# Patient Record
Sex: FEMALE | Race: WHITE | Employment: UNEMPLOYED | URBAN - METROPOLITAN AREA
[De-identification: names, ages, dates, MRNs, and addresses within clinical notes are randomized per-mention and may not be internally consistent; named-entity substitution may affect disease eponyms.]

---

## 2018-06-29 ENCOUNTER — HOSPITAL ENCOUNTER (INPATIENT)
Age: 23
LOS: 3 days | Discharge: HOME | DRG: 177 | End: 2018-07-02
Attending: FAMILY MEDICINE | Admitting: FAMILY MEDICINE
Payer: COMMERCIAL

## 2018-06-29 DIAGNOSIS — J90 PLEURAL EFFUSION ON RIGHT: ICD-10-CM

## 2018-06-29 DIAGNOSIS — J86.9 EMPYEMA LUNG (HCC): Primary | ICD-10-CM

## 2018-06-29 LAB
ANION GAP SERPL CALCULATED.3IONS-SCNC: 12 MMOL/L (ref 9–17)
BUN BLDV-MCNC: 10 MG/DL (ref 6–20)
BUN/CREAT BLD: NORMAL (ref 9–20)
CALCIUM SERPL-MCNC: 8.9 MG/DL (ref 8.6–10.4)
CHLORIDE BLD-SCNC: 102 MMOL/L (ref 98–107)
CO2: 24 MMOL/L (ref 20–31)
CREAT SERPL-MCNC: 0.77 MG/DL (ref 0.5–0.9)
GFR AFRICAN AMERICAN: >60 ML/MIN
GFR NON-AFRICAN AMERICAN: >60 ML/MIN
GFR SERPL CREATININE-BSD FRML MDRD: NORMAL ML/MIN/{1.73_M2}
GFR SERPL CREATININE-BSD FRML MDRD: NORMAL ML/MIN/{1.73_M2}
GLUCOSE BLD-MCNC: 81 MG/DL (ref 70–99)
HCT VFR BLD CALC: 36.5 % (ref 36.3–47.1)
HEMOGLOBIN: 11.2 G/DL (ref 11.9–15.1)
MCH RBC QN AUTO: 26 PG (ref 25.2–33.5)
MCHC RBC AUTO-ENTMCNC: 30.7 G/DL (ref 28.4–34.8)
MCV RBC AUTO: 84.9 FL (ref 82.6–102.9)
NRBC AUTOMATED: 0 PER 100 WBC
PDW BLD-RTO: 15.9 % (ref 11.8–14.4)
PLATELET # BLD: 410 K/UL (ref 138–453)
PMV BLD AUTO: 9.4 FL (ref 8.1–13.5)
POTASSIUM SERPL-SCNC: 3.9 MMOL/L (ref 3.7–5.3)
RBC # BLD: 4.3 M/UL (ref 3.95–5.11)
SODIUM BLD-SCNC: 138 MMOL/L (ref 135–144)
WBC # BLD: 9.5 K/UL (ref 4.5–13.5)

## 2018-06-29 PROCEDURE — 2500000003 HC RX 250 WO HCPCS: Performed by: FAMILY MEDICINE

## 2018-06-29 PROCEDURE — 36415 COLL VENOUS BLD VENIPUNCTURE: CPT

## 2018-06-29 PROCEDURE — 6370000000 HC RX 637 (ALT 250 FOR IP): Performed by: NURSE PRACTITIONER

## 2018-06-29 PROCEDURE — 6360000002 HC RX W HCPCS: Performed by: CLINICAL NURSE SPECIALIST

## 2018-06-29 PROCEDURE — 2580000003 HC RX 258: Performed by: CLINICAL NURSE SPECIALIST

## 2018-06-29 PROCEDURE — 80048 BASIC METABOLIC PNL TOTAL CA: CPT

## 2018-06-29 PROCEDURE — 85027 COMPLETE CBC AUTOMATED: CPT

## 2018-06-29 PROCEDURE — 1200000000 HC SEMI PRIVATE

## 2018-06-29 RX ORDER — POTASSIUM CHLORIDE 20 MEQ/1
40 TABLET, EXTENDED RELEASE ORAL PRN
Status: DISCONTINUED | OUTPATIENT
Start: 2018-06-29 | End: 2018-07-02 | Stop reason: HOSPADM

## 2018-06-29 RX ORDER — POTASSIUM CHLORIDE 20MEQ/15ML
40 LIQUID (ML) ORAL PRN
Status: DISCONTINUED | OUTPATIENT
Start: 2018-06-29 | End: 2018-07-02 | Stop reason: HOSPADM

## 2018-06-29 RX ORDER — ONDANSETRON 2 MG/ML
4 INJECTION INTRAMUSCULAR; INTRAVENOUS EVERY 6 HOURS PRN
Status: DISCONTINUED | OUTPATIENT
Start: 2018-06-29 | End: 2018-07-02 | Stop reason: HOSPADM

## 2018-06-29 RX ORDER — NICOTINE 21 MG/24HR
1 PATCH, TRANSDERMAL 24 HOURS TRANSDERMAL DAILY PRN
Status: DISCONTINUED | OUTPATIENT
Start: 2018-06-29 | End: 2018-07-02 | Stop reason: HOSPADM

## 2018-06-29 RX ORDER — BISACODYL 10 MG
10 SUPPOSITORY, RECTAL RECTAL DAILY PRN
Status: DISCONTINUED | OUTPATIENT
Start: 2018-06-29 | End: 2018-07-02 | Stop reason: HOSPADM

## 2018-06-29 RX ORDER — VANCOMYCIN HYDROCHLORIDE 1 G/200ML
1000 INJECTION, SOLUTION INTRAVENOUS EVERY 12 HOURS
Status: DISCONTINUED | OUTPATIENT
Start: 2018-06-29 | End: 2018-06-30

## 2018-06-29 RX ORDER — MAGNESIUM SULFATE 1 G/100ML
1 INJECTION INTRAVENOUS PRN
Status: DISCONTINUED | OUTPATIENT
Start: 2018-06-29 | End: 2018-07-02 | Stop reason: HOSPADM

## 2018-06-29 RX ORDER — ACETAMINOPHEN 500 MG
1000 TABLET ORAL EVERY 6 HOURS PRN
Status: DISCONTINUED | OUTPATIENT
Start: 2018-06-29 | End: 2018-07-02 | Stop reason: HOSPADM

## 2018-06-29 RX ORDER — SODIUM CHLORIDE 0.9 % (FLUSH) 0.9 %
10 SYRINGE (ML) INJECTION PRN
Status: DISCONTINUED | OUTPATIENT
Start: 2018-06-29 | End: 2018-07-02 | Stop reason: HOSPADM

## 2018-06-29 RX ORDER — SODIUM CHLORIDE 0.9 % (FLUSH) 0.9 %
10 SYRINGE (ML) INJECTION EVERY 12 HOURS SCHEDULED
Status: DISCONTINUED | OUTPATIENT
Start: 2018-06-29 | End: 2018-07-02 | Stop reason: HOSPADM

## 2018-06-29 RX ORDER — POTASSIUM CHLORIDE 7.45 MG/ML
10 INJECTION INTRAVENOUS PRN
Status: DISCONTINUED | OUTPATIENT
Start: 2018-06-29 | End: 2018-07-02 | Stop reason: HOSPADM

## 2018-06-29 RX ADMIN — ACETAMINOPHEN 1000 MG: 500 TABLET ORAL at 20:40

## 2018-06-29 RX ADMIN — Medication 10 ML: at 21:00

## 2018-06-29 RX ADMIN — VANCOMYCIN HYDROCHLORIDE 1250 MG: 10 INJECTION, POWDER, LYOPHILIZED, FOR SOLUTION INTRAVENOUS at 21:34

## 2018-06-29 RX ADMIN — TAZOBACTAM SODIUM AND PIPERACILLIN SODIUM 3.38 G: 375; 3 INJECTION, SOLUTION INTRAVENOUS at 21:01

## 2018-06-29 ASSESSMENT — PAIN SCALES - GENERAL
PAINLEVEL_OUTOF10: 10
PAINLEVEL_OUTOF10: 8

## 2018-06-29 NOTE — PROGRESS NOTES
Attempted to gain IV access. 3 failed attempts. Pt has now admitted to IV drug abuse. Pt had to be coaxed into letting RN try to gain access. RN called Nursing supervisor to see if she could gain access; she will see what she can do.

## 2018-06-30 PROBLEM — L01.00 IMPETIGO: Status: ACTIVE | Noted: 2018-06-30

## 2018-06-30 PROBLEM — F19.10 DRUG ABUSE (HCC): Status: ACTIVE | Noted: 2018-06-30

## 2018-06-30 PROBLEM — J90 PLEURAL EFFUSION ON RIGHT: Status: ACTIVE | Noted: 2018-06-30

## 2018-06-30 PROBLEM — B18.2 CHRONIC HEPATITIS C WITHOUT HEPATIC COMA (HCC): Status: ACTIVE | Noted: 2018-06-30

## 2018-06-30 PROCEDURE — 6370000000 HC RX 637 (ALT 250 FOR IP): Performed by: FAMILY MEDICINE

## 2018-06-30 PROCEDURE — 2580000003 HC RX 258: Performed by: CLINICAL NURSE SPECIALIST

## 2018-06-30 PROCEDURE — 99254 IP/OBS CNSLTJ NEW/EST MOD 60: CPT | Performed by: INTERNAL MEDICINE

## 2018-06-30 PROCEDURE — 6360000002 HC RX W HCPCS: Performed by: INTERNAL MEDICINE

## 2018-06-30 PROCEDURE — 2500000003 HC RX 250 WO HCPCS: Performed by: CLINICAL NURSE SPECIALIST

## 2018-06-30 PROCEDURE — 6360000002 HC RX W HCPCS: Performed by: CLINICAL NURSE SPECIALIST

## 2018-06-30 PROCEDURE — 99222 1ST HOSP IP/OBS MODERATE 55: CPT | Performed by: FAMILY MEDICINE

## 2018-06-30 PROCEDURE — 6370000000 HC RX 637 (ALT 250 FOR IP): Performed by: NURSE PRACTITIONER

## 2018-06-30 PROCEDURE — 1200000000 HC SEMI PRIVATE

## 2018-06-30 PROCEDURE — 94762 N-INVAS EAR/PLS OXIMTRY CONT: CPT

## 2018-06-30 RX ORDER — LORAZEPAM 0.5 MG/1
0.5 TABLET ORAL 2 TIMES DAILY PRN
Status: DISCONTINUED | OUTPATIENT
Start: 2018-06-30 | End: 2018-07-02 | Stop reason: HOSPADM

## 2018-06-30 RX ORDER — SERTRALINE HYDROCHLORIDE 25 MG/1
25 TABLET, FILM COATED ORAL DAILY
Status: DISCONTINUED | OUTPATIENT
Start: 2018-06-30 | End: 2018-07-01

## 2018-06-30 RX ORDER — OXYCODONE HYDROCHLORIDE AND ACETAMINOPHEN 5; 325 MG/1; MG/1
1 TABLET ORAL EVERY 4 HOURS PRN
Status: DISCONTINUED | OUTPATIENT
Start: 2018-06-30 | End: 2018-07-02 | Stop reason: HOSPADM

## 2018-06-30 RX ADMIN — OXYCODONE HYDROCHLORIDE AND ACETAMINOPHEN 1 TABLET: 5; 325 TABLET ORAL at 21:22

## 2018-06-30 RX ADMIN — ACETAMINOPHEN 1000 MG: 500 TABLET ORAL at 11:13

## 2018-06-30 RX ADMIN — OXYCODONE HYDROCHLORIDE AND ACETAMINOPHEN 1 TABLET: 5; 325 TABLET ORAL at 12:43

## 2018-06-30 RX ADMIN — OXYCODONE HYDROCHLORIDE AND ACETAMINOPHEN 1 TABLET: 5; 325 TABLET ORAL at 17:19

## 2018-06-30 RX ADMIN — ACETAMINOPHEN 1000 MG: 500 TABLET ORAL at 04:56

## 2018-06-30 RX ADMIN — TAZOBACTAM SODIUM AND PIPERACILLIN SODIUM 3.38 G: 375; 3 INJECTION, SOLUTION INTRAVENOUS at 11:13

## 2018-06-30 RX ADMIN — Medication 10 ML: at 21:13

## 2018-06-30 RX ADMIN — Medication 2 G: at 17:17

## 2018-06-30 RX ADMIN — LORAZEPAM 0.5 MG: 0.5 TABLET ORAL at 12:43

## 2018-06-30 RX ADMIN — VANCOMYCIN HYDROCHLORIDE 1000 MG: 1 INJECTION, SOLUTION INTRAVENOUS at 08:53

## 2018-06-30 RX ADMIN — SERTRALINE 25 MG: 25 TABLET, FILM COATED ORAL at 12:43

## 2018-06-30 RX ADMIN — TAZOBACTAM SODIUM AND PIPERACILLIN SODIUM 3.38 G: 375; 3 INJECTION, SOLUTION INTRAVENOUS at 04:30

## 2018-06-30 ASSESSMENT — PAIN SCALES - GENERAL
PAINLEVEL_OUTOF10: 4
PAINLEVEL_OUTOF10: 4
PAINLEVEL_OUTOF10: 0
PAINLEVEL_OUTOF10: 7
PAINLEVEL_OUTOF10: 7
PAINLEVEL_OUTOF10: 6
PAINLEVEL_OUTOF10: 10
PAINLEVEL_OUTOF10: 6
PAINLEVEL_OUTOF10: 7
PAINLEVEL_OUTOF10: 5

## 2018-06-30 ASSESSMENT — PAIN DESCRIPTION - ORIENTATION
ORIENTATION: RIGHT
ORIENTATION: RIGHT

## 2018-06-30 ASSESSMENT — PAIN DESCRIPTION - LOCATION
LOCATION: CHEST
LOCATION: CHEST

## 2018-06-30 ASSESSMENT — PAIN DESCRIPTION - ONSET
ONSET: ON-GOING
ONSET: ON-GOING

## 2018-06-30 ASSESSMENT — PAIN DESCRIPTION - DESCRIPTORS
DESCRIPTORS: TENDER;SORE
DESCRIPTORS: CONSTANT;SHARP;SORE;TENDER

## 2018-06-30 ASSESSMENT — PAIN DESCRIPTION - PROGRESSION
CLINICAL_PROGRESSION: NOT CHANGED
CLINICAL_PROGRESSION: GRADUALLY IMPROVING

## 2018-06-30 ASSESSMENT — PAIN DESCRIPTION - FREQUENCY
FREQUENCY: CONTINUOUS
FREQUENCY: CONTINUOUS

## 2018-06-30 ASSESSMENT — PAIN DESCRIPTION - PAIN TYPE
TYPE: ACUTE PAIN
TYPE: ACUTE PAIN

## 2018-06-30 NOTE — CONSULTS
[facility] - Initial Consult Note  Today's Date and Time: 6/30/2018, 10:37 AM    Impression :   1. RUL pneumonia  2. Rt side empyema   3. Depression  4. Hx of polysubstance abuse. Has been clean for about 3 months. 5. Hepatitis C    Recommendations:   · Cefazolin 2 gm IV q 8 hr  · Evaluation by Thoracic surgery    Medical Decision Making/Summary/Discussion:   · Young patient with depression and recent problems (6 months) with polysubstance abuse. Currently drug free for about 3 months according to her. · Seen because of chest pain after a fall. · Found to have nodular RUL lesion. (Most likeley pneumonia in retrospect) and small effusion on 6-19-18. · Treated with zosyn and vancomycin with worsening of effusion. (Perhaps fluid retention and Na load from zosyn)  · Underwent needle biopsy of RUL nodular lesion with growth of MSSA on culture on 6-27-18. No associated septicemia. No evidence of multiple septic emboli. · Had attempt at chest tube on 6-28-18 with little fluid recovered. Chest tube out same day. · Referred for thoracic surgery evaluation. Infection Control Recommendations   · Baylis Precautions  · Contact Isolation       Antimicrobial Stewardship Recommendations     · Simplification of therapy  · Targeted therapy    Coordination of Outpatient Care:   · Estimated Length of IV antimicrobials:2-4 weeks  · Patient will need Midline Catheter Insertion: TBD  · Patient will need PICC line Insertion:No  · Patient will need: Home IV , [place],  SNF,  LTAC: TBD  · Patient will need outpatient wound care:No    Chief complaint/reason for consultation:   · Empyema? History of Present Illness:   Cheryle Dean is a 25y.o.-year-old  female who was initially admitted on 6/29/2018. Patient seen at the request of Gerardo Pardo. Patient indicates that she presented to ER at [facility] with chest pain after sustaining a fall.  She was evaluated and released with ibuprofen. Pain continued leading her to return to ER on 6-19-18. A CT was done which revealed a pleuritic nodular lesion and a small effusion. She apparently received antibiotic treatment. (zosyn and vancomycin)    Patient had further progression of the Rt side effusion and on 6-27-18 she underwent a biopsy of the RUL nodular lesion which seemingly produced some bleeding. Culture of the aspirate showed MSSA. Blood cultures showed no growth. Urine grew E coli. ECHO showed no vegetations. Patient had a subsequent chest tube insertion which apparently did not yield much  The chest tube was inserted and removed the same day. (6-28-18 according to patient). CT 6-28-18 shows extensive RLL effusion with few air bubbles. She has been transferred for evaluation by thoracic surgery with concern with MSSA empyema. Patient currently looks non toxic. She is very anxious about what treatment may entail and wishes to expedite treatment because she has two young children to take care of. Past Hx of polysubstance abuse over past 6 months or so because of ending a long term relationship, personal losses. Indicates she smoked marijuana in high school. Got in with the wrong crowd and ended using cocaine and heroin. Has evidence of Hepatitis C exposure. Cultures:  Urine:  ·   Blood:  ·   Sputum :  ·   Wound:  ·     Discussed with patient, RN. I have personally reviewed the past medical history, past surgical history, medications, social history, and family history, and I have updated the database accordingly.   Past Medical History:     Past Medical History:   Diagnosis Date    Drug abuse, cocaine type     Drug abuse, IV        Past Surgical  History:     Past Surgical History:   Procedure Laterality Date    ARM SURGERY Right 03/01/2018    Cellulitus       Medications:      sodium chloride flush  10 mL Intravenous 2 times per day    vancomycin (VANCOCIN) intermittent dosing (placeholder)

## 2018-06-30 NOTE — PLAN OF CARE
Problem: Falls - Risk of:  Goal: Will remain free from falls  Will remain free from falls   Outcome: Met This Shift    Goal: Absence of physical injury  Absence of physical injury   Outcome: Ongoing      Problem: Pain:  Goal: Pain level will decrease  Pain level will decrease   Outcome: Ongoing    Goal: Control of acute pain  Control of acute pain   Outcome: Ongoing    Goal: Control of chronic pain  Control of chronic pain   Outcome: Ongoing

## 2018-06-30 NOTE — PROGRESS NOTES
Dr Lucretia Asher wants writer to recall thoracic surgeon see pt today  Perfect served thoracic surgeon the followingDr Lucretia Asher wants you to see pt told her your partner  would see in am she stated Tell them the patient is sooo anxious and need to see them  today thanks perfect served thoracic surgeon the above  Perfect served thoracic surgeon above, no response back

## 2018-06-30 NOTE — H&P
[facility]    HISTORY AND PHYSICAL EXAMINATION            Date:   6/30/2018  Patient name:  Ric Murdock  Date of admission:  6/29/2018  5:12 PM  MRN:   [id number]  Account:  [de-identified]  YOB: 1995  PCP:    No primary care provider on file. Room:   [address] [place] [address]  Code Status:    Full Code    Chief Complaint:     Chest pain    History Obtained From:     patient,  medical record    History of Present Illness: The patient is a 25 y.o. Non-/non  female who presents with No chief complaint on file. and she is admitted to the hospital for the management of  Empyema. Patient was transferred from outside facility where she stayed there for couple of days where she was diagnosed PNA  ( RUL mass) with R pleural effusion and , she was treated with Vanc and zosyn . CT guided biopsy was done from RUL and R pleural effusion that showed MSSA on  6/27   then  chest tube was placed on 6/28  for empyema but it was clogged shortly with no change in imaging. Decortication ws thought to be an option and patient was transferred for further assessment  She also was diagnosed with impetigo and asymptomatic bacteriuria.     Patient has no known history of IV drug abuse and also was diagnosed with hepatitis C that needed more workup as an outpatient, Per the patient report she did not develop any fever or chills throughout the course of her sickness  She has some chest pain and discomfort on R side, but denies sob, nausea, vomiting, fever or chills    Past Medical History:     Past Medical History:   Diagnosis Date    Drug abuse, cocaine type     Drug abuse, IV         Past Surgical History:     Past Surgical History:   Procedure Laterality Date    ARM SURGERY Right 03/01/2018    Cellulitus        Medications Prior to Admission:     Prior to Admission medications    Not on File        Allergies:     Patient has no known oriented to person, place, and time with normal affect  Head:  normocephalic, atraumatic. Eye: no icterus, redness, pupils equal and reactive, extraocular eye movements intact, conjunctiva clear  Ear: normal external ear, no discharge, hearing intact  Nose:  no drainage noted  Mouth: mucous membranes moist  Neck: supple, no carotid bruits, thyroid not palpable  Lungs: diminished AE along R lung side, chest tube incisions noted  Cardiovascular: normal rate, regular rhythm, no murmur, gallop, rub.   Abdomen: Soft, nontender, nondistended, normal bowel sounds, no hepatomegaly or splenomegaly  Neurologic: There are no new focal motor or sensory deficits, normal muscle tone and bulk, no abnormal sensation, normal speech, cranial nerves II through XII grossly intact  Skin: healing impetigo noticed on face  Extremities:  peripheral pulses palpable, no pedal edema or calf pain with palpation  Psych: normal affect    Investigations:      Laboratory Testing:  Recent Results (from the past 24 hour(s))   Basic Metabolic Panel w/ Reflex to MG    Collection Time: 06/29/18  8:24 PM   Result Value Ref Range    Glucose 81 70 - 99 mg/dL    BUN 10 6 - 20 mg/dL    CREATININE 0.77 0.50 - 0.90 mg/dL    Bun/Cre Ratio NOT REPORTED 9 - 20    Calcium 8.9 8.6 - 10.4 mg/dL    Sodium 138 135 - 144 mmol/L    Potassium 3.9 3.7 - 5.3 mmol/L    Chloride 102 98 - 107 mmol/L    CO2 24 20 - 31 mmol/L    Anion Gap 12 9 - 17 mmol/L    GFR Non-African American >60 >60 mL/min    GFR African American >60 >60 mL/min    GFR Comment          GFR Staging NOT REPORTED    CBC    Collection Time: 06/29/18  8:24 PM   Result Value Ref Range    WBC 9.5 4.5 - 13.5 k/uL    RBC 4.30 3.95 - 5.11 m/uL    Hemoglobin 11.2 (L) 11.9 - 15.1 g/dL    Hematocrit 36.5 36.3 - 47.1 %    MCV 84.9 82.6 - 102.9 fL    MCH 26.0 25.2 - 33.5 pg    MCHC 30.7 28.4 - 34.8 g/dL    RDW 15.9 (H) 11.8 - 14.4 %    Platelets 613 583 - 959 k/uL    MPV 9.4 8.1 - 13.5 fL    NRBC Automated 0.0 0.0 per 100 WBC       Imaging/Diagnostics:    Images is currently being uploaded in our system  Reviewed    Assessment :      Primary Problem  Empyema lung [facility])    Active Hospital Problems    Diagnosis Date Noted    Empyema lung ([facility] [J86.9] 06/29/2018     Priority: High    Pleural effusion on right [J90] 06/30/2018    Impetigo [L01.00] 06/30/2018    Chronic hepatitis C without hepatic coma ([facility] [address] [B18.2] 06/30/2018    Drug abuse [F19.10] 06/30/2018       Plan:     Patient status Admit as inpatient in the  Med/Surge    Continue Vancomycin, would DC Zosyn if OK with ID  Pain management  Anxiety management  CTS conult for possible Decortication   ID consult as patient might need IV Abx for weeks   DVT and GI PPx  Patient has a lot of concerns over her 2 young kids ( 32 years old) and that is why she wants to go home to be available for them, I reassured her that she should make sure her condition is addressed as long as she knows they are in safe hands      Consultations:   IP CONSULT TO INFECTIOUS DISEASES  IP CONSULT TO [facility]     Patient is admitted as inpatient status because of co-morbidities listed above, severity of signs and symptoms as outlined, requirement for current medical therapies and most importantly because of direct risk to patient if care not provided in a hospital setting. Ryan Mishra MD  6/30/2018  3:13 PM    Copy sent to Dr. Tipton primary care provider on file.

## 2018-06-30 NOTE — PROGRESS NOTES
Pharmacy Note  Vancomycin Consult    Armida Ruelas is a 25 y.o. female started on Vancomycin for  ; consult received from Dr. Tom Tarango to manage therapy. Also receiving the following antibiotics: zosyn. Patient Active Problem List   Diagnosis    Empyema lung ([address]       Allergies:  Patient has no known allergies. Temp max: 98.6 F  (37 C)    Recent Labs      06/29/18 2024   BUN  10       Recent Labs      06/29/18 2024   CREATININE  0.77       Recent Labs      06/29/18 2024   WBC  9.5       No intake or output data in the 24 hours ending 06/29/18 2119    Culture Date      Source                       Results       Ht Readings from Last 1 Encounters:   06/29/18 5' 7\" (1.702 m)        Wt Readings from Last 1 Encounters:   06/29/18 135 lb 1.6 oz (61.3 kg)         Body mass index is 21.16 kg/m². Estimated Creatinine Clearance: 112 mL/min (based on SCr of 0.77 mg/dL). Goal Trough Level: 10-15 mcg/mL    Assessment/Plan:  Will initiate vancomycin 1000 mg IV every 12 hours. Timing of trough level will be determined based on culture results, renal function, and clinical response. Thank you for the consult. Will continue to follow.

## 2018-06-30 NOTE — PLAN OF CARE
Problem: Falls - Risk of:  Goal: Will remain free from falls  Will remain free from falls   Outcome: Met This Shift    Goal: Absence of physical injury  Absence of physical injury   Outcome: Met This Shift      Problem: Pain:  Goal: Pain level will decrease  Pain level will decrease   Outcome: Met This Shift    Goal: Control of acute pain  Control of acute pain   Outcome: Met This Shift    Goal: Control of chronic pain  Control of chronic pain   Outcome: Met This Shift

## 2018-07-01 ENCOUNTER — APPOINTMENT (OUTPATIENT)
Dept: CT IMAGING | Age: 23
DRG: 177 | End: 2018-07-01
Attending: FAMILY MEDICINE
Payer: COMMERCIAL

## 2018-07-01 LAB
ANION GAP SERPL CALCULATED.3IONS-SCNC: 13 MMOL/L (ref 9–17)
BUN BLDV-MCNC: 12 MG/DL (ref 6–20)
BUN/CREAT BLD: ABNORMAL (ref 9–20)
CALCIUM SERPL-MCNC: 8.7 MG/DL (ref 8.6–10.4)
CHLORIDE BLD-SCNC: 103 MMOL/L (ref 98–107)
CO2: 22 MMOL/L (ref 20–31)
CREAT SERPL-MCNC: 0.78 MG/DL (ref 0.5–0.9)
GFR AFRICAN AMERICAN: >60 ML/MIN
GFR NON-AFRICAN AMERICAN: >60 ML/MIN
GFR SERPL CREATININE-BSD FRML MDRD: ABNORMAL ML/MIN/{1.73_M2}
GFR SERPL CREATININE-BSD FRML MDRD: ABNORMAL ML/MIN/{1.73_M2}
GLUCOSE BLD-MCNC: 113 MG/DL (ref 70–99)
HCT VFR BLD CALC: 38.1 % (ref 36.3–47.1)
HEMOGLOBIN: 11.8 G/DL (ref 11.9–15.1)
MCH RBC QN AUTO: 26.1 PG (ref 25.2–33.5)
MCHC RBC AUTO-ENTMCNC: 31 G/DL (ref 28.4–34.8)
MCV RBC AUTO: 84.3 FL (ref 82.6–102.9)
NRBC AUTOMATED: 0 PER 100 WBC
PDW BLD-RTO: 15.9 % (ref 11.8–14.4)
PLATELET # BLD: 524 K/UL (ref 138–453)
PMV BLD AUTO: 9.6 FL (ref 8.1–13.5)
POTASSIUM SERPL-SCNC: 4.1 MMOL/L (ref 3.7–5.3)
RBC # BLD: 4.52 M/UL (ref 3.95–5.11)
SODIUM BLD-SCNC: 138 MMOL/L (ref 135–144)
WBC # BLD: 13.4 K/UL (ref 4.5–13.5)

## 2018-07-01 PROCEDURE — 80048 BASIC METABOLIC PNL TOTAL CA: CPT

## 2018-07-01 PROCEDURE — 1200000000 HC SEMI PRIVATE

## 2018-07-01 PROCEDURE — 6370000000 HC RX 637 (ALT 250 FOR IP): Performed by: FAMILY MEDICINE

## 2018-07-01 PROCEDURE — 71250 CT THORAX DX C-: CPT

## 2018-07-01 PROCEDURE — 2580000003 HC RX 258: Performed by: CLINICAL NURSE SPECIALIST

## 2018-07-01 PROCEDURE — 85027 COMPLETE CBC AUTOMATED: CPT

## 2018-07-01 PROCEDURE — 6370000000 HC RX 637 (ALT 250 FOR IP): Performed by: NURSE PRACTITIONER

## 2018-07-01 PROCEDURE — 99233 SBSQ HOSP IP/OBS HIGH 50: CPT | Performed by: INTERNAL MEDICINE

## 2018-07-01 PROCEDURE — 99232 SBSQ HOSP IP/OBS MODERATE 35: CPT | Performed by: FAMILY MEDICINE

## 2018-07-01 PROCEDURE — 36415 COLL VENOUS BLD VENIPUNCTURE: CPT

## 2018-07-01 PROCEDURE — 6360000002 HC RX W HCPCS: Performed by: INTERNAL MEDICINE

## 2018-07-01 RX ADMIN — OXYCODONE HYDROCHLORIDE AND ACETAMINOPHEN 1 TABLET: 5; 325 TABLET ORAL at 22:19

## 2018-07-01 RX ADMIN — Medication 2 G: at 00:50

## 2018-07-01 RX ADMIN — OXYCODONE HYDROCHLORIDE AND ACETAMINOPHEN 1 TABLET: 5; 325 TABLET ORAL at 14:31

## 2018-07-01 RX ADMIN — LORAZEPAM 0.5 MG: 0.5 TABLET ORAL at 01:56

## 2018-07-01 RX ADMIN — OXYCODONE HYDROCHLORIDE AND ACETAMINOPHEN 1 TABLET: 5; 325 TABLET ORAL at 06:01

## 2018-07-01 RX ADMIN — Medication 2 G: at 08:33

## 2018-07-01 RX ADMIN — OXYCODONE HYDROCHLORIDE AND ACETAMINOPHEN 1 TABLET: 5; 325 TABLET ORAL at 01:56

## 2018-07-01 RX ADMIN — SERTRALINE 25 MG: 25 TABLET, FILM COATED ORAL at 08:33

## 2018-07-01 RX ADMIN — OXYCODONE HYDROCHLORIDE AND ACETAMINOPHEN 1 TABLET: 5; 325 TABLET ORAL at 10:02

## 2018-07-01 RX ADMIN — ACETAMINOPHEN 500 MG: 500 TABLET ORAL at 21:36

## 2018-07-01 RX ADMIN — Medication 10 ML: at 21:07

## 2018-07-01 RX ADMIN — OXYCODONE HYDROCHLORIDE AND ACETAMINOPHEN 1 TABLET: 5; 325 TABLET ORAL at 18:35

## 2018-07-01 RX ADMIN — Medication 2 G: at 16:32

## 2018-07-01 RX ADMIN — LORAZEPAM 0.5 MG: 0.5 TABLET ORAL at 14:33

## 2018-07-01 RX ADMIN — Medication 10 ML: at 08:33

## 2018-07-01 ASSESSMENT — PAIN DESCRIPTION - ORIENTATION: ORIENTATION: RIGHT

## 2018-07-01 ASSESSMENT — PAIN SCALES - GENERAL
PAINLEVEL_OUTOF10: 4
PAINLEVEL_OUTOF10: 4
PAINLEVEL_OUTOF10: 5
PAINLEVEL_OUTOF10: 7
PAINLEVEL_OUTOF10: 5
PAINLEVEL_OUTOF10: 7
PAINLEVEL_OUTOF10: 9
PAINLEVEL_OUTOF10: 7
PAINLEVEL_OUTOF10: 4
PAINLEVEL_OUTOF10: 3
PAINLEVEL_OUTOF10: 6

## 2018-07-01 ASSESSMENT — PAIN DESCRIPTION - DESCRIPTORS: DESCRIPTORS: TENDER;SORE

## 2018-07-01 ASSESSMENT — PAIN DESCRIPTION - PROGRESSION: CLINICAL_PROGRESSION: GRADUALLY IMPROVING

## 2018-07-01 ASSESSMENT — PAIN DESCRIPTION - FREQUENCY: FREQUENCY: CONTINUOUS

## 2018-07-01 ASSESSMENT — PAIN DESCRIPTION - ONSET: ONSET: ON-GOING

## 2018-07-01 ASSESSMENT — PAIN DESCRIPTION - LOCATION: LOCATION: CHEST

## 2018-07-01 ASSESSMENT — PAIN DESCRIPTION - PAIN TYPE: TYPE: ACUTE PAIN

## 2018-07-01 NOTE — PROGRESS NOTES
Consulted for empyema. Attempted to see during morning rounds. Patient Afebrile VSS, /94, WBC 9.5. No CXR, or CT available. Unable to give recommendations until imaging available. Will repeat CT scan here.     Carol Goodman

## 2018-07-01 NOTE — PROGRESS NOTES
[facility]    Progress Note    7/1/2018    4:42 PM    Name:   Alec Shay  MRN:     [id number]     [place]:      [de-identified]   Room:   [address] [place] [address] Day:  2  Admit Date:  6/29/2018  5:12 PM    PCP:   No primary care provider on file. Code Status:  Full Code    Subjective:     C/C: Empyem    Interval History Status: improved. Patient seen and examined at bedside, no acute events overnight. Seen by CTS and ID  Re imaging ordered this am by CTS, thought yesterday that outside facility imaging was being downloaded to our system  Continue to breath fine   Positive urine screen at outside facility  Patient denies any chest pain, shortness of breath, chills, fevers, nausea or vomiting. Patient vitals, labs and all providers notes were reviewed,from overnight shift and morning updates were noted and discussed with the nurse    Brief History:     The patient is a 25 y.o. Non-/non  female who presents with No chief complaint on file. and she is admitted to the hospital for the management of  Empyema. Patient was transferred from outside facility where she stayed there for couple of days where she was diagnosed PNA  ( RUL mass) with R pleural effusion and , she was treated with Vanc and zosyn . CT guided biopsy was done from RUL and R pleural effusion that showed MSSA on  6/27   then  chest tube was placed on 6/28  for empyema but it was clogged shortly with no change in imaging.  Decortication ws thought to be an option and patient was transferred for further assessment  She also was diagnosed with impetigo and asymptomatic bacteriuria.     Patient has no known history of IV drug abuse and also was diagnosed with hepatitis C that needed more workup as an outpatient, Per the patient report she did not develop any fever or chills throughout the course of her sickness  She has some chest pain and discomfort on R side, but denies sob, nausea, vomiting, fever or chills  On admission patient was evaluated by infectious disease consult to thoracic surgery, antibiotic was switched to cefazolin and CTS requested repeat imaging. : CT chest without contrast pending    Review of Systems:     Constitutional:  negative for chills, fevers, sweats  Respiratory:  negative for cough, dyspnea on exertion, hemoptysis, shortness of breath, wheezing  Cardiovascular:  negative for chest pain, chest pressure/discomfort, lower extremity edema, palpitations  Gastrointestinal:  negative for abdominal pain, constipation, diarrhea, nausea, vomiting  Neurological:  negative for dizziness, headache    Medications: Allergies:  No Known Allergies    Current Meds:   Scheduled Meds:    sertraline  25 mg Oral Daily    ceFAZolin  2 g Intravenous Q8H    sodium chloride flush  10 mL Intravenous 2 times per day     Continuous Infusions:   PRN Meds: oxyCODONE-acetaminophen, LORazepam, sodium chloride flush, potassium chloride **OR** potassium chloride **OR** potassium chloride, magnesium sulfate, magnesium hydroxide, bisacodyl, ondansetron, nicotine, acetaminophen    Data:     Past Medical History:   has a past medical history of Drug abuse, cocaine type and Drug abuse, IV. Social History:   reports that she quit smoking about 1 weeks ago. Her smoking use included Cigarettes. She started smoking about 4 years ago. She has a 4.00 pack-year smoking history. She has never used smokeless tobacco. She reports that she uses drugs, including IV, Cocaine, Opiates , and Marijuana, about 3 times per week. She reports that she does not drink alcohol. Family History: No family history on file.     Vitals:  /63   Pulse 79   Temp 99.5 °F (37.5 °C) (Oral)   Resp 18   Ht 5' 7\" (1.702 m)   Wt 135 lb 1.6 oz (61.3 kg)   SpO2 97%   BMI 21.16 kg/m²   Temp (24hrs), Av °F (37.2 °C), Min:98.3 °F (36.8 °C), Max:99.5 °F (37.5 °C)    No results for input(s): POCGLU in the last 72 hours. I/O (24Hr): No intake or output data in the 24 hours ending 07/01/18 1642    Labs:    Hematology:  Recent Labs      06/29/18 2024 07/01/18   1531   WBC  9.5  13.4   RBC  4.30  4.52   HGB  11.2*  11.8*   HCT  36.5  38.1   MCV  84.9  84.3   MCH  26.0  26.1   MCHC  30.7  31.0   RDW  15.9*  15.9*   PLT  410  524*   MPV  9.4  9.6     Chemistry:  Recent Labs      06/29/18 2024   NA  138   K  3.9   CL  102   CO2  24   GLUCOSE  81   BUN  10   CREATININE  0.77   ANIONGAP  12   LABGLOM  >60   GFRAA  >60   CALCIUM  8.9     No results for input(s): PROT, LABALBU, LABA1C, A9AXBBC, N0VYWME, FT4, TSH, AST, ALT, LDH, GGT, ALKPHOS, LABGGT, BILITOT, BILIDIR, AMMONIA, AMYLASE, LIPASE, LACTATE, CHOL, HDL, LDLCHOLESTEROL, CHOLHDLRATIO, TRIG, VLDL, TOK98EQ, PHENYTOIN, PHENYF, URICACID, POCGLU in the last 72 hours.       No results found for: SPECIAL  No results found for: CULTURE    No results found for: POCPH, PHART, PH, POCPCO2, YWE9HKY, PCO2, POCPO2, PO2ART, PO2, POCHCO3, NFX6YXT, HCO3, NBEA, PBEA, BEART, BE, THGBART, THB, HIS7DCJ, LZYB9FNU, N1TWVATH, O2SAT, FIO2    Radiology:         Physical Examination:        General appearance:  alert, cooperative and no distress  Mental Status:  oriented to person, place and time and normal affect  Lungs:  clear to auscultation bilaterally, normal effort  Heart:  regular rate and rhythm, no murmur  Abdomen:  soft, nontender, nondistended, normal bowel sounds, no masses, hepatomegaly, splenomegaly  Extremities:  no edema, redness, tenderness in the calves  Skin:  no gross lesions, rashes, induration    Assessment:        Primary Problem  Empyema lung ([facility] [address]    Active Hospital Problems    Diagnosis Date Noted    [facility] [J86.9] 06/29/2018     Priority: High    Pleural effusion on right [J90] 06/30/2018    Impetigo [L01.00] 06/30/2018    Chronic [facility] without hepatic coma [facility] [B18.2] 06/30/2018    Drug abuse [F19.10] 06/30/2018    MSSA (methicillin susceptible Staphylococcus aureus) infection [A49.01]        Plan:            Switched to cefazolin for ID  Pain management  Anxiety management  CTS conult on bord, final decision is after repeat CT chest review, likely no intervention   ID consult  patient will need IV Abx for weeks   DVT and GI PPx  Patient has a lot of concerns over her 2 young kids ( 32 years old) and that is why she wants to go home to be available for them, I reassured her that she should make sure her condition is addressed as long as she knows they are in safe hands.   Need for IV Abx and her abuse history will make placement necessary    Monica Reinoso MD  7/1/2018  4:42 PM

## 2018-07-01 NOTE — PROGRESS NOTES
y.o.-year-old  female who was initially admitted on 6/29/2018. Patient seen at the request of Bea Marie. Patient indicates that she presented to ER at [facility] with chest pain after sustaining a fall. She was evaluated and released with ibuprofen. Pain continued leading her to return to ER on 6-19-18. A CT was done which revealed a pleuritic nodular lesion and a small effusion. She apparently received antibiotic treatment. (zosyn and vancomycin)    Patient had further progression of the Rt side effusion and on 6-27-18 she underwent a biopsy of the RUL nodular lesion which seemingly produced some bleeding. Culture of the aspirate showed MSSA. Blood cultures showed no growth. Urine grew E coli. ECHO showed no vegetations. Patient had a subsequent chest tube insertion which apparently did not yield much  The chest tube was inserted and removed the same day. (6-28-18 according to patient). CT 6-28-18 shows extensive RLL effusion with few air bubbles. She has been transferred for evaluation by thoracic surgery with concern with MSSA empyema. Patient currently looks non toxic. She is very anxious about what treatment may entail and wishes to expedite treatment because she has two young children to take care of. Past Hx of polysubstance abuse over past 6 months or so because of ending a long term relationship, personal losses. Indicates she smoked marijuana in high school. Got in with the wrong crowd and ended using cocaine and heroin. Has evidence of Hepatitis C exposure. Cultures:  Sputum :  MSSA   Urine Ecoli       Discussed with patient, RN. I have personally reviewed the past medical history, past surgical history, medications, social history, and family history, and I have updated the database accordingly.   Past Medical History:     Past Medical History:   Diagnosis Date    Drug abuse, cocaine type     Drug abuse, IV        Past Surgical  History:     Past Surgical History:   Procedure Laterality Date    ARM SURGERY Right 03/01/2018    Cellulitus       Medications:      sertraline  25 mg Oral Daily    ceFAZolin  2 g Intravenous Q8H    sodium chloride flush  10 mL Intravenous 2 times per day       Social History:     Social History     Social History    Marital status: Single     Spouse name: N/A    Number of children: N/A    Years of education: N/A     Occupational History    Not on file. Social History Main Topics    Smoking status: Former Smoker     Packs/day: 1.00     Years: 4.00     Types: Cigarettes     Start date: 6/1/2014     Quit date: 6/18/2018    Smokeless tobacco: Never Used    Alcohol use No    Drug use: Yes     Frequency: 3.0 times per week     Types: IV, Cocaine, Opiates , Marijuana    Sexual activity: Not on file     Other Topics Concern    Not on file     Social History Narrative    No narrative on file       Family History:   No family history on file. Allergies:   Patient has no known allergies. Review of Systems:   Constitutional: No fevers or chills. No systemic complaints  Head: No headaches  Eyes: No double vision or blurry vision. No conjunctival inflammation. ENT: No sore throat or runny nose. . No hearing loss, tinnitus or vertigo. Cardiovascular: No chest pain or palpitations. No shortness of breath. No CAZARES  Lung: No shortness of breath or cough. No sputum production. Chest wall pain  Abdomen: No nausea, vomiting, diarrhea, or abdominal pain. Sherlean Narrow No cramps. Genitourinary: No increased urinary frequency, or dysuria. No hematuria. No suprapubic or CVA pain  Musculoskeletal: No muscle aches or pains. No joint effusions, swelling or deformities  Hematologic: No bleeding or bruising. Neurologic: No headache, weakness, numbness, or tingling. Integument: No rash, no ulcers. Facial and arm lesions. Psychiatric: No depression. Endocrine: No polyuria, no polydipsia, no polyphagia.     Physical Examination :     Patient Vitals for and CXR reviewed; RUL pneumonia (small area)  RT pleural effusion with compressive atelectasis. Medical Decision Making-Other:   ECHO at [facility]: No vegetations. Thank you for allowing us to participate in the care of this patient. Please call with questions. Tosha Bell MD  PGY-1, Internal Medicine resident  [facility]. ATTESTATION:    I have discussed the case, including pertinent history and exam findings with the residents. I have seen and examined the patient and the key elements of the encounter have been performed by me. I have reviewed the laboratory data, other diagnostic studies and discussed them with the residents. I have updated the medical record where necessary. I agree with the assessment, plan and orders as documented by the resident.     Elisa Mendoza MD.

## 2018-07-01 NOTE — CONSULTS
ondansetron (ZOFRAN) injection 4 mg, 4 mg, Intravenous, Q6H PRN, Skip Zuleyka, APRN - CNS    nicotine (NICODERM CQ) 21 MG/24HR 1 patch, 1 patch, Transdermal, Daily PRN, Skip Zuleyka, APRN - CNS    acetaminophen (TYLENOL) tablet 1,000 mg, 1,000 mg, Oral, Q6H PRN, [place] Collins, APRN - CNP, 1,000 mg at 06/30/18 1113    Social Hx:  She  reports that she quit smoking about 1 weeks ago. Her smoking use included Cigarettes. She started smoking about 4 years ago. She has a 4.00 pack-year smoking history. She has never used smokeless tobacco.  Family Hx:  family history is not on file. ROS:    Review of Systems    Physical Examination    Vitals:  Vitals:    06/30/18 2115   BP: (!) 137/98   Pulse: 63   Resp: 18   Temp: 98.3 °F (36.8 °C)   SpO2: 99%     Physical Exam    Testing:  Cardiac cath: none    Echo: none    CXR:/CT scan: Neither a CXR or CT has been obtained since this patients transfer. There is a CT loaded in PACS from 6/28 from an outside hospital.      Assessment:  In summary, Ms. Shelia Wallace is a 25y.o. year-old female with a cloudy thoracic intervention history as above at an outside hospital.  Ironically, the patient was transferred here for thoracic management yet no chest Xray was ordered and the previous chest CT was not in the system yesterday morning when we rounded. Please obtain a new PA and Lateral chest film and new non-contrast chest CT to assess for interval fluid accumulation/interval pleural thickening/changes as well as to establish current baseline for future follow up. I would tend to agree with ID that as good as she looks it is unlikely that she will require intervention but the decision will rest on updated imaging.     Problem List:   Patient Active Problem List   Diagnosis    Empyema lung (HCC)    Pleural effusion on right    Impetigo    Chronic hepatitis C without hepatic coma (HCC)    Drug abuse    MSSA (methicillin susceptible Staphylococcus aureus) infection

## 2018-07-01 NOTE — CARE COORDINATION
Transition planning  Met with pt to discuss needs post hospital - possible need for long-term IV antibiotics. Pt has hx of IV drug use - discussed difficulties with home infusion and long term IV access, pt verbalizes understanding. Discussed SNF placement if able to locate accepting SNF - pt refuses this option as she has 2 small children to care for. [facility]. Currently on IV antibiotic three times daily - unsure if this schedule can be accommodated in an outpatient setting and will need to be followed up during business hours. Shawna phone # [phone], attempted to call no answer. She will be staying with her father after the hospital and states that she will have transportation to get to and from outpatient infusion, if needed. .    Pt states frustration and concern over being away from her children and anxious to discharge so she can be with them.  Support given, SW referral offered to ensure that she has acces to community resources to provide support - declined as she has seen and received information from the  at the previous hospital.

## 2018-07-02 VITALS
RESPIRATION RATE: 17 BRPM | DIASTOLIC BLOOD PRESSURE: 108 MMHG | HEART RATE: 96 BPM | OXYGEN SATURATION: 94 % | SYSTOLIC BLOOD PRESSURE: 152 MMHG | TEMPERATURE: 98.5 F | HEIGHT: 67 IN | WEIGHT: 135.1 LBS | BODY MASS INDEX: 21.2 KG/M2

## 2018-07-02 LAB
ABO/RH: NORMAL
ANTIBODY SCREEN: NEGATIVE
ARM BAND NUMBER: NORMAL
EXPIRATION DATE: NORMAL

## 2018-07-02 PROCEDURE — 99255 IP/OBS CONSLTJ NEW/EST HI 80: CPT | Performed by: THORACIC SURGERY (CARDIOTHORACIC VASCULAR SURGERY)

## 2018-07-02 PROCEDURE — 6360000002 HC RX W HCPCS: Performed by: STUDENT IN AN ORGANIZED HEALTH CARE EDUCATION/TRAINING PROGRAM

## 2018-07-02 PROCEDURE — 6360000002 HC RX W HCPCS: Performed by: INTERNAL MEDICINE

## 2018-07-02 PROCEDURE — 86900 BLOOD TYPING SEROLOGIC ABO: CPT

## 2018-07-02 PROCEDURE — 2580000003 HC RX 258: Performed by: CLINICAL NURSE SPECIALIST

## 2018-07-02 PROCEDURE — 99239 HOSP IP/OBS DSCHRG MGMT >30: CPT | Performed by: INTERNAL MEDICINE

## 2018-07-02 PROCEDURE — 2500000003 HC RX 250 WO HCPCS: Performed by: STUDENT IN AN ORGANIZED HEALTH CARE EDUCATION/TRAINING PROGRAM

## 2018-07-02 PROCEDURE — 99233 SBSQ HOSP IP/OBS HIGH 50: CPT | Performed by: INTERNAL MEDICINE

## 2018-07-02 PROCEDURE — 36415 COLL VENOUS BLD VENIPUNCTURE: CPT

## 2018-07-02 PROCEDURE — 86901 BLOOD TYPING SEROLOGIC RH(D): CPT

## 2018-07-02 PROCEDURE — 6370000000 HC RX 637 (ALT 250 FOR IP): Performed by: FAMILY MEDICINE

## 2018-07-02 PROCEDURE — 86850 RBC ANTIBODY SCREEN: CPT

## 2018-07-02 RX ADMIN — LORAZEPAM 0.5 MG: 0.5 TABLET ORAL at 06:28

## 2018-07-02 RX ADMIN — CEFTRIAXONE SODIUM 1 G: 2 INJECTION, POWDER, FOR SOLUTION INTRAMUSCULAR; INTRAVENOUS at 16:35

## 2018-07-02 RX ADMIN — OXYCODONE HYDROCHLORIDE AND ACETAMINOPHEN 1 TABLET: 5; 325 TABLET ORAL at 11:40

## 2018-07-02 RX ADMIN — OXYCODONE HYDROCHLORIDE AND ACETAMINOPHEN 1 TABLET: 5; 325 TABLET ORAL at 06:28

## 2018-07-02 RX ADMIN — OXYCODONE HYDROCHLORIDE AND ACETAMINOPHEN 1 TABLET: 5; 325 TABLET ORAL at 16:35

## 2018-07-02 RX ADMIN — Medication 10 ML: at 09:12

## 2018-07-02 RX ADMIN — Medication 2 G: at 09:11

## 2018-07-02 RX ADMIN — Medication 2 G: at 00:37

## 2018-07-02 ASSESSMENT — PAIN SCALES - GENERAL
PAINLEVEL_OUTOF10: 7
PAINLEVEL_OUTOF10: 2
PAINLEVEL_OUTOF10: 8
PAINLEVEL_OUTOF10: 3
PAINLEVEL_OUTOF10: 7
PAINLEVEL_OUTOF10: 2
PAINLEVEL_OUTOF10: 3

## 2018-07-02 NOTE — DISCHARGE SUMMARY
[facility] 19    Discharge Summary     Patient ID: Radha Mendieta  :  1995   MRN: [id number]     ACCOUNT:  [de-identified]   Patient's PCP: No primary care provider on file. Admit Date: 2018   Discharge Date: 2018     Length of Stay: 3  Code Status:  Full Code  Admitting Physician: Burton Momin MD  Discharge Physician: Troy Hair MD     Active Discharge Diagnoses:     Hospital Problem Lists:  Principal Problem:    Empyema lung ([address]  Active Problems:    Pleural effusion on right    Impetigo    Chronic hepatitis C without hepatic coma ([address]    Drug abuse    MSSA (methicillin susceptible Staphylococcus aureus) infection  Resolved Problems:    * No resolved hospital problems. *      Admission Condition:  poor     Discharged Condition: fair    Hospital Stay:     Hospital Course:  Radha Mendieta is a 25 y.o. female who was admitted for the management of   Empyema lung ([address] , presented to ER with chest pain. The patient is a 25 y.o.  Non-/non  female who presents with Empyema.   and she is admitted to the hospital for the management of  Empyema. Patient was transferred from outside facility where she stayed there for couple of days where she was diagnosed PNA  ( RUL mass) with R pleural effusion and , she was treated with Vanc and zosyn .  CT guided biopsy was done from RUL and R pleural effusion that showed MSSA on     then  chest tube was placed on   for empyema but it was clogged shortly with no change in imaging.  Decortication ws thought to be an option and patient was transferred for further assessment  She also was diagnosed with impetigo and asymptomatic bacteriuria.     Patient has no known history of IV drug abuse and also was diagnosed with hepatitis C that needed more workup as an outpatient, Per the patient report she did not develop any fever or chills throughout the course of her sickness  She has some chest pain and discomfort on R side, but denies sob, nausea, vomiting, fever or chills  On admission patient was evaluated by infectious disease consult to thoracic surgery, antibiotic was switched to cefazolin and CTS requested repeat imaging. 7/1: CT chest without contrast confirmed loculated effusion. 7/2: CTS and ID suggested surgery to patient but patient adamantly refused. Significant therapeutic interventions:     Principal Problem: Empyema lung ([address] CT Chest without contrast showed persistent right sided loculated pleural fluid, with right basilar consolidation consistent with focal pneumonia. CTS and ID on board. Biopsy with culture grew MSSA. Patient insisting on discharge despite repeated efforts to  her, assuage her fears and misgivings regarding surgery. Plan now for discharge with 3 weeks IV ceftriaxone daily at infusion center. F/U With ID in 3 weeks. CTS info provided in case of worsening of symptoms.      Pain Mx     Anxiety Mx. Cont prozac. PRN Ativan.      H/O IVDU. No line placement. Daily IV antibiotics.      Chronic hepatitis C without hepatic coma (HCC)     Tobacco Abuse Disorder. Cont PRN Nicotine patches.      Check Electrolytes and Electrolytes replacement as needed      DVT prophylaxis: Encourage ambulation. SCDs.      PT, OT as needed.       Significant Diagnostic Studies:   Labs / Micro:  CBC:   Lab Results   Component Value Date    WBC 13.4 07/01/2018    RBC 4.52 07/01/2018    HGB 11.8 07/01/2018    HCT 38.1 07/01/2018    MCV 84.3 07/01/2018    MCH 26.1 07/01/2018    MCHC 31.0 07/01/2018    RDW 15.9 07/01/2018     07/01/2018     BMP:    Lab Results   Component Value Date    GLUCOSE 113 07/01/2018     07/01/2018    K 4.1 07/01/2018     07/01/2018    CO2 22 07/01/2018    ANIONGAP 13 07/01/2018    BUN 12 07/01/2018    CREATININE 0.78 07/01/2018    BUNCRER NOT REPORTED 07/01/2018    CALCIUM 8.7 07/01/2018    LABGLOM >60 07/01/2018    GFRAA >60 07/01/2018    GFR      07/01/2018    GFR NOT REPORTED 07/01/2018     HFP:  No results found for: ALB, PROT  CMP:    Lab Results   Component Value Date    GLUCOSE 113 07/01/2018     07/01/2018    K 4.1 07/01/2018     07/01/2018    CO2 22 07/01/2018    BUN 12 07/01/2018    CREATININE 0.78 07/01/2018    ANIONGAP 13 07/01/2018    LABGLOM >60 07/01/2018    GFRAA >60 07/01/2018    GFR      07/01/2018    GFR NOT REPORTED 07/01/2018    CALCIUM 8.7 07/01/2018     Radiology:    Ct Chest Wo Contrast   Result Date: 7/1/2018  1. Mildly loculated pleural fluid and gas collection at the right lung base, measuring 10 x 4.5 x 8.7 cm, compatible with provided clinical history of empyema. A pigtail pleural catheter previously seen in this location has been removed. 2.  Focal airspace consolidation in the posterior aspect of the right upper lobe, not significantly changed from the previous outside study dated 6/28/2018. This was previously biopsied on 6/27/2018 at [facility].  The appearance is most suggestive of a focal pneumonia, but correlation with pathology results is recommended. Consultations:    Consults:     Final Specialist Recommendations/Findings:   IP CONSULT TO INFECTIOUS DISEASES  IP CONSULT TO CARDIOTHORACIC SURGERY      The patient was seen and examined on day of discharge and this discharge summary is in conjunction with any daily progress note from day of discharge.     Discharge plan:     Disposition: Home    Physician Follow Up:     [address]      appt time 2 pm (enter through front door of hospital go to welcome desk then go to 2nd floor)      Ivana Hinojosa MD  [address] [facility],[address] [facility]    In 3 weeks      MD [address]  [address] [place] [phone]    Call  If symptoms worsen       Requiring Further

## 2018-07-02 NOTE — PROGRESS NOTES
and asymptomatic bacteriuria.     Patient has no known history of IV drug abuse and also was diagnosed with hepatitis C that needed more workup as an outpatient, Per the patient report she did not develop any fever or chills throughout the course of her sickness  She has some chest pain and discomfort on R side, but denies sob, nausea, vomiting, fever or chills  On admission patient was evaluated by infectious disease consult to thoracic surgery, antibiotic was switched to cefazolin and CTS requested repeat imaging. 7/1: CT chest without contrast pending    Review of Systems:     Negative except for those highlighted:    CONSTITUTIONAL:  fevers, chills, sweats, fatigue, weight loss, generalized weakness  HEENT:  Vision changes, hearing changes, runny nose, throat pain  RESPIRATORY:  shortness of breath, cough, congestion, wheezing. CARDIOVASCULAR:  chest pain, palpitations  GASTROINTESTINAL:  nausea, vomiting, diarrhea, constipation, change in bowel habits, abdominal pain   GENITOURINARY:  difficulty of urination, burning with urination, frequency   INTEGUMENT:  rash, skin lesions, easy bruising   HEMATOLOGIC/LYMPHATIC:  swelling/edema   ALLERGIC/IMMUNOLOGIC:  urticaria , itching  ENDOCRINE:  increase in drinking, increase in urination, hot or cold intolerance  MUSCULOSKELETAL:  Right chest/flank pain  NEUROLOGICAL:  headaches, dizziness, lightheadedness, numbness, pain, tingling extremities  BEHAVIOR/PSYCH:  depression, anxiety    Medications:      Allergies:  No Known Allergies    Current Meds:   Scheduled Meds:    sertraline  50 mg Oral Daily    ceFAZolin  2 g Intravenous Q8H    sodium chloride flush  10 mL Intravenous 2 times per day     Continuous Infusions:   PRN Meds: oxyCODONE-acetaminophen, LORazepam, sodium chloride flush, potassium chloride **OR** potassium chloride **OR** potassium chloride, magnesium sulfate, magnesium hydroxide, bisacodyl, ondansetron, nicotine, acetaminophen    Data:     Past Medical History:   has a past medical history of Drug abuse, cocaine type and Drug abuse, IV. Social History:   reports that she quit smoking about 2 weeks ago. Her smoking use included Cigarettes. She started smoking about 4 years ago. She has a 4.00 pack-year smoking history. She has never used smokeless tobacco. She reports that she uses drugs, including IV, Cocaine, Opiates , and Marijuana, about 3 times per week. She reports that she does not drink alcohol. Family History: No family history on file. Vitals:  BP (!) 138/94   Pulse 76   Temp 98.4 °F (36.9 °C) (Oral)   Resp 18   Ht 5' 7\" (1.702 m)   Wt 135 lb 1.6 oz (61.3 kg)   SpO2 97%   BMI 21.16 kg/m²   Temp (24hrs), Av.1 °F (37.3 °C), Min:98.4 °F (36.9 °C), Max:99.5 °F (37.5 °C)    No results for input(s): POCGLU in the last 72 hours. I/O (24Hr): No intake or output data in the 24 hours ending 18 0737    Labs:    Hematology:  Recent Labs      18   1531   WBC  9.5  13.4   RBC  4.30  4.52   HGB  11.2*  11.8*   HCT  36.5  38.1   MCV  84.9  84.3   MCH  26.0  26.1   MCHC  30.7  31.0   RDW  15.9*  15.9*   PLT  410  524*   MPV  9.4  9.6     Chemistry:  Recent Labs      18   1531   NA  138  138   K  3.9  4.1   CL  102  103   CO2  24  22   GLUCOSE  81  113*   BUN  10  12   CREATININE  0.77  0.78   ANIONGAP  12  13   LABGLOM  >60  >60   GFRAA  >60  >60   CALCIUM  8.9  8.7     No results for input(s): PROT, LABALBU, LABA1C, O0UATVC, J5YNLHM, FT4, TSH, AST, ALT, LDH, GGT, ALKPHOS, LABGGT, BILITOT, BILIDIR, AMMONIA, AMYLASE, LIPASE, LACTATE, CHOL, HDL, LDLCHOLESTEROL, CHOLHDLRATIO, TRIG, VLDL, LRR40RP, PHENYTOIN, PHENYF, URICACID, POCGLU in the last 72 hours.       No results found for: SPECIAL  No results found for: CULTURE    No results found for: POCPH, PHART, PH, POCPCO2, BFZ0KTP, PCO2, POCPO2, PO2ART, PO2, POCHCO3, XZQ5VLI, HCO3, NBEA, PBEA, BEART, BE, THGBART, THB, TBI7JPB, DACL2URP, Z5GUOIPM, O2SAT, FIO2    Radiology:    Ct Chest Wo Contrast  Result Date: 2018  EXAMINATION: CT OF THE CHEST WITHOUT CONTRAST 2018 9:53 am    1. Mildly loculated pleural fluid and gas collection at the right lung base, measuring 10 x 4.5 x 8.7 cm, compatible with provided clinical history of empyema. A pigtail pleural catheter previously seen in this location has been removed. 2.  Focal airspace consolidation in the posterior aspect of the right upper lobe, not significantly changed from the previous outside study dated 2018. This was previously biopsied on 2018 at [facility].  The appearance is most suggestive of a focal pneumonia, but correlation with pathology results is recommended. Physical Examination:        BP (!) 138/94   Pulse 76   Temp 98.4 °F (36.9 °C) (Oral)   Resp 18   Ht 5' 7\" (1.702 m)   Wt 135 lb 1.6 oz (61.3 kg)   SpO2 97%   BMI 21.16 kg/m²   Temp (24hrs), Av.1 °F (37.3 °C), Min:98.4 °F (36.9 °C), Max:99.5 °F (37.5 °C)    No results for input(s): POCGLU in the last 72 hours. No intake or output data in the 24 hours ending 18 0737    General Appearance:  alert, well appearing, and in no acute distress  Mental status: oriented to person, place, and time with normal affect  Head:  normocephalic, atraumatic. Eye: no icterus, redness, pupils equal and reactive, extraocular eye movements intact, conjunctiva clear  Ear: normal external ear, no discharge, hearing intact  Nose:  no drainage noted  Mouth: mucous membranes moist  Neck: supple, no carotid bruits, thyroid not palpable  Lungs: Decrease breath sounds Right middle lung downwards. Cardiovascular: normal rate, regular rhythm, no murmur, gallop, rub.   Abdomen: Soft, nontender, nondistended, normal bowel sounds, no hepatomegaly or splenomegaly  Neurologic: There are no new focal motor or sensory deficits, normal muscle tone and bulk, no abnormal sensation, normal speech, cranial nerves II through XII grossly intact  Skin: No gross lesions, rashes, bruising or bleeding on exposed skin area  Extremities:  peripheral pulses palpable, no pedal edema or calf pain with palpation  Psych: anxious, irritable, requesting discharge      Assessment:        Principal Problem:    Empyema lung ([address]  Active Problems:    Pleural effusion on right    Impetigo    Chronic hepatitis C without hepatic coma (HCC)    Drug abuse    MSSA (methicillin susceptible Staphylococcus aureus) infection  Resolved Problems:    * No resolved hospital problems. *      Plan:        Principal Problem: Empyema lung ([address] CT Chest without contrast showed persistent right sided loculated pleural fluid, with right basilar consolidation consistent with focal pneumonia. CTS and ID on board. Biopsy with culture grew MSSA. Patient insisting on discharge despite repeated efforts to  her, assuage her fears and misgivings regarding surgery. Plan now for discharge with 3 weeks IV ceftriaxone daily at infusion center. F/U With ID in 3 weeks. CTS info provided in case of worsening of symptoms. Pain Mx    Anxiety Mx. Cont prozac. PRN Ativan. H/O IVDU. No line placement. Daily IV antibiotics. Chronic hepatitis C without hepatic coma (HCC)    Tobacco Abuse Disorder. Cont PRN Nicotine patches. Check Electrolytes and Electrolytes replacement as needed     DVT prophylaxis: Encourage ambulation. SCDs. PT, OT as needed.       IV Fluids: ,    Nutrition:  DIET GENERAL;      Consultations:   IP CONSULT TO INFECTIOUS DISEASES  IP CONSULT TO CARDIOTHORACIC SURGERY  IP CONSULT TO IV TEAM      Elana Hart MD  7/2/2018  7:37 AM

## 2018-07-02 NOTE — CARE COORDINATION
Plan home with op infusion, called [facility] [phone] spoke to [address] can accept dr landon's order. Cannot accommodate tid infusion hours are 10-7 or 7-5   15:25 Dr Carlos Alberto Dubois rounding plan rocephin 2 gm x 21 days called marianne cunningham with [facility] infusion [facility] to discuss referral faxed requested documents appt till 2pm daily. Ps dr Wiley Hightower  16;30 attempted to set up pcp called two office's require patient to fill out paperwork prior for review before accepting.  Gave patients address will mail information to patient will also give patient list to schedule follow up

## 2018-07-02 NOTE — PROGRESS NOTES
advice offered by several physicians. Infection Control Recommendations   · [place] Precautions  Antimicrobial Stewardship Recommendations     · Simplification of therapy  · Targeted therapy    Coordination of Outpatient Care:   · Estimated Length of IV antimicrobials:2-4 weeks  · Patient will need Midline Catheter Insertion: TBD  · Patient will need PICC line Insertion:No  · Patient will need: Home IV , [place],  SNF,  LTAC: TBD  · Patient will need outpatient wound care:No    Chief complaint/reason for consultation:   · Empyema? History of Present Illness:   Elmira Robbins is a 25y.o.-year-old  female who was initially admitted on 6/29/2018. Patient seen at the request of Ni Hunter. Patient indicates that she presented to ER at [facility] with chest pain after sustaining a fall. She was evaluated and released with ibuprofen. Pain continued leading her to return to ER on 6-19-18. A CT was done which revealed a pleuritic nodular lesion and a small effusion. She apparently received antibiotic treatment. (zosyn and vancomycin)    Patient had further progression of the Rt side effusion and on 6-27-18 she underwent a biopsy of the RUL nodular lesion which seemingly produced some bleeding. Culture of the aspirate showed MSSA. Blood cultures showed no growth. Urine grew E coli. ECHO showed no vegetations. Patient had a subsequent chest tube insertion which apparently did not yield much  The chest tube was inserted and removed the same day. (6-28-18 according to patient). CT 6-28-18 shows extensive RLL effusion with few air bubbles. She has been transferred for evaluation by thoracic surgery with concern with MSSA empyema. Patient currently looks non toxic. She is very anxious about what treatment may entail and wishes to expedite treatment because she has two young children to take care of.     Past Hx of polysubstance abuse over past 6 months or so because of ending a long

## 2018-07-03 ENCOUNTER — TELEPHONE (OUTPATIENT)
Dept: INFECTIOUS DISEASES | Age: 23
End: 2018-07-03

## 2018-07-03 NOTE — LETTER
[facility] [address] [place] [address].  [address] [facility],5[address] [address]  Phone: [phone]  Fax: [phone]    Frantz Ramirez MD        July 9, 2018    [place] [id number] [facility] [address] [facility]      Dear Shawna Guevara: We have tried to reach you by phone with no success. Please contact our office at [phone] to schedule a follow up appointment.       Sincerely,    Frantz Ramirez MD

## 2018-07-03 NOTE — TELEPHONE ENCOUNTER
I called patient and voicemail message asks me to enter a mailbox number. Unable to leave a message.

## 2019-02-27 PROCEDURE — 99284 EMERGENCY DEPT VISIT MOD MDM: CPT

## 2019-02-28 ENCOUNTER — HOSPITAL ENCOUNTER (EMERGENCY)
Facility: HOSPITAL | Age: 24
Discharge: HOME | End: 2019-02-28
Attending: EMERGENCY MEDICINE | Admitting: EMERGENCY MEDICINE

## 2019-02-28 ENCOUNTER — APPOINTMENT (OUTPATIENT)
Dept: ULTRASOUND IMAGING | Facility: HOSPITAL | Age: 24
End: 2019-02-28

## 2019-02-28 VITALS
HEART RATE: 90 BPM | DIASTOLIC BLOOD PRESSURE: 88 MMHG | BODY MASS INDEX: 22.5 KG/M2 | WEIGHT: 140 LBS | SYSTOLIC BLOOD PRESSURE: 142 MMHG | HEIGHT: 66 IN | TEMPERATURE: 98 F | RESPIRATION RATE: 16 BRPM | OXYGEN SATURATION: 99 %

## 2019-02-28 DIAGNOSIS — N39.0 ACUTE UTI: Primary | ICD-10-CM

## 2019-02-28 LAB
ALBUMIN SERPL-MCNC: 4.5 G/DL (ref 3.5–5)
ALBUMIN/GLOB SERPL: 1.4 G/DL (ref 1.5–2.5)
ALP SERPL-CCNC: 102 U/L (ref 35–104)
ALT SERPL W P-5'-P-CCNC: 297 U/L (ref 10–36)
ANION GAP SERPL CALCULATED.3IONS-SCNC: 3.4 MMOL/L (ref 3.6–11.2)
AST SERPL-CCNC: 74 U/L (ref 10–30)
B-HCG UR QL: NEGATIVE
BACTERIA UR QL AUTO: ABNORMAL /HPF
BASOPHILS # BLD AUTO: 0.05 10*3/MM3 (ref 0–0.3)
BASOPHILS NFR BLD AUTO: 0.9 % (ref 0–2)
BILIRUB SERPL-MCNC: 0.6 MG/DL (ref 0.2–1.8)
BILIRUB UR QL STRIP: NEGATIVE
BUN BLD-MCNC: 16 MG/DL (ref 7–21)
BUN/CREAT SERPL: 16.5 (ref 7–25)
CALCIUM SPEC-SCNC: 9.7 MG/DL (ref 7.7–10)
CHLORIDE SERPL-SCNC: 106 MMOL/L (ref 99–112)
CLARITY UR: ABNORMAL
CO2 SERPL-SCNC: 27.6 MMOL/L (ref 24.3–31.9)
COLOR UR: ABNORMAL
CREAT BLD-MCNC: 0.97 MG/DL (ref 0.43–1.29)
DEPRECATED RDW RBC AUTO: 48.7 FL (ref 37–54)
EOSINOPHIL # BLD AUTO: 0.2 10*3/MM3 (ref 0–0.7)
EOSINOPHIL NFR BLD AUTO: 3.7 % (ref 0–5)
ERYTHROCYTE [DISTWIDTH] IN BLOOD BY AUTOMATED COUNT: 15.3 % (ref 11.5–14.5)
GFR SERPL CREATININE-BSD FRML MDRD: 72 ML/MIN/1.73
GLOBULIN UR ELPH-MCNC: 3.3 GM/DL
GLUCOSE BLD-MCNC: 70 MG/DL (ref 70–110)
GLUCOSE UR STRIP-MCNC: NEGATIVE MG/DL
HCT VFR BLD AUTO: 43.6 % (ref 37–47)
HGB BLD-MCNC: 14.5 G/DL (ref 12–16)
HGB UR QL STRIP.AUTO: NEGATIVE
HYALINE CASTS UR QL AUTO: ABNORMAL /LPF
IMM GRANULOCYTES # BLD AUTO: 0 10*3/MM3 (ref 0–0.03)
IMM GRANULOCYTES NFR BLD AUTO: 0 % (ref 0–0.5)
KETONES UR QL STRIP: ABNORMAL
LEUKOCYTE ESTERASE UR QL STRIP.AUTO: ABNORMAL
LYMPHOCYTES # BLD AUTO: 1.98 10*3/MM3 (ref 1–3)
LYMPHOCYTES NFR BLD AUTO: 36.4 % (ref 21–51)
MCH RBC QN AUTO: 29.4 PG (ref 27–33)
MCHC RBC AUTO-ENTMCNC: 33.3 G/DL (ref 33–37)
MCV RBC AUTO: 88.3 FL (ref 80–94)
MONOCYTES # BLD AUTO: 0.69 10*3/MM3 (ref 0.1–0.9)
MONOCYTES NFR BLD AUTO: 12.7 % (ref 0–10)
MUCOUS THREADS URNS QL MICRO: ABNORMAL /HPF
NEUTROPHILS # BLD AUTO: 2.52 10*3/MM3 (ref 1.4–6.5)
NEUTROPHILS NFR BLD AUTO: 46.3 % (ref 30–70)
NITRITE UR QL STRIP: NEGATIVE
OSMOLALITY SERPL CALC.SUM OF ELEC: 273.4 MOSM/KG (ref 273–305)
PH UR STRIP.AUTO: 5.5 [PH] (ref 5–8)
PLATELET # BLD AUTO: 319 10*3/MM3 (ref 130–400)
PMV BLD AUTO: 9.6 FL (ref 6–10)
POTASSIUM BLD-SCNC: 3.6 MMOL/L (ref 3.5–5.3)
PROT SERPL-MCNC: 7.8 G/DL (ref 6–8)
PROT UR QL STRIP: ABNORMAL
RBC # BLD AUTO: 4.94 10*6/MM3 (ref 4.2–5.4)
RBC # UR: ABNORMAL /HPF
REF LAB TEST METHOD: ABNORMAL
SODIUM BLD-SCNC: 137 MMOL/L (ref 135–153)
SP GR UR STRIP: 1.02 (ref 1–1.03)
SQUAMOUS #/AREA URNS HPF: ABNORMAL /HPF
UROBILINOGEN UR QL STRIP: ABNORMAL
WBC NRBC COR # BLD: 5.44 10*3/MM3 (ref 4.5–12.5)
WBC UR QL AUTO: ABNORMAL /HPF

## 2019-02-28 PROCEDURE — 36415 COLL VENOUS BLD VENIPUNCTURE: CPT

## 2019-02-28 PROCEDURE — 81001 URINALYSIS AUTO W/SCOPE: CPT | Performed by: PHYSICIAN ASSISTANT

## 2019-02-28 PROCEDURE — 76830 TRANSVAGINAL US NON-OB: CPT

## 2019-02-28 PROCEDURE — 80053 COMPREHEN METABOLIC PANEL: CPT | Performed by: PHYSICIAN ASSISTANT

## 2019-02-28 PROCEDURE — 85025 COMPLETE CBC W/AUTO DIFF WBC: CPT | Performed by: PHYSICIAN ASSISTANT

## 2019-02-28 PROCEDURE — 76830 TRANSVAGINAL US NON-OB: CPT | Performed by: RADIOLOGY

## 2019-02-28 PROCEDURE — 81025 URINE PREGNANCY TEST: CPT | Performed by: PHYSICIAN ASSISTANT

## 2019-02-28 RX ORDER — QUETIAPINE FUMARATE 50 MG/1
50 TABLET, FILM COATED ORAL NIGHTLY
COMMUNITY

## 2019-02-28 RX ORDER — NITROFURANTOIN 25; 75 MG/1; MG/1
100 CAPSULE ORAL EVERY 12 HOURS SCHEDULED
Qty: 14 CAPSULE | Refills: 0 | Status: SHIPPED | OUTPATIENT
Start: 2019-02-28 | End: 2019-03-07

## 2019-02-28 RX ORDER — CLONIDINE HYDROCHLORIDE 0.1 MG/1
0.1 TABLET ORAL 2 TIMES DAILY
COMMUNITY

## 2019-07-30 ENCOUNTER — HOSPITAL ENCOUNTER (INPATIENT)
Age: 24
LOS: 2 days | Discharge: HOME | DRG: 885 | End: 2019-08-01
Attending: PSYCHIATRY & NEUROLOGY | Admitting: PSYCHIATRY & NEUROLOGY
Payer: COMMERCIAL

## 2019-07-30 PROBLEM — F32.2 SEVERE MAJOR DEPRESSION (HCC): Status: ACTIVE | Noted: 2019-07-30

## 2019-07-30 PROCEDURE — 6370000000 HC RX 637 (ALT 250 FOR IP): Performed by: NURSE PRACTITIONER

## 2019-07-30 PROCEDURE — 6370000000 HC RX 637 (ALT 250 FOR IP): Performed by: PSYCHIATRY & NEUROLOGY

## 2019-07-30 PROCEDURE — 1240000000 HC EMOTIONAL WELLNESS R&B

## 2019-07-30 PROCEDURE — 90792 PSYCH DIAG EVAL W/MED SRVCS: CPT | Performed by: NURSE PRACTITIONER

## 2019-07-30 RX ORDER — QUETIAPINE FUMARATE 200 MG/1
200 TABLET, FILM COATED ORAL NIGHTLY
Status: ON HOLD | COMMUNITY
End: 2019-07-31 | Stop reason: HOSPADM

## 2019-07-30 RX ORDER — TRAZODONE HYDROCHLORIDE 50 MG/1
50 TABLET ORAL NIGHTLY PRN
Status: DISCONTINUED | OUTPATIENT
Start: 2019-07-30 | End: 2019-08-01 | Stop reason: HOSPADM

## 2019-07-30 RX ORDER — CLONIDINE HYDROCHLORIDE 0.1 MG/1
0.2 TABLET ORAL 2 TIMES DAILY
Status: DISCONTINUED | OUTPATIENT
Start: 2019-07-30 | End: 2019-08-01 | Stop reason: HOSPADM

## 2019-07-30 RX ORDER — HYDROXYZINE 50 MG/1
50 TABLET, FILM COATED ORAL 3 TIMES DAILY PRN
Status: DISCONTINUED | OUTPATIENT
Start: 2019-07-30 | End: 2019-08-01 | Stop reason: HOSPADM

## 2019-07-30 RX ORDER — NICOTINE 21 MG/24HR
1 PATCH, TRANSDERMAL 24 HOURS TRANSDERMAL DAILY
Status: DISCONTINUED | OUTPATIENT
Start: 2019-07-30 | End: 2019-07-30

## 2019-07-30 RX ORDER — ESCITALOPRAM OXALATE 10 MG/1
10 TABLET ORAL DAILY
Status: DISCONTINUED | OUTPATIENT
Start: 2019-07-30 | End: 2019-08-01 | Stop reason: HOSPADM

## 2019-07-30 RX ORDER — NICOTINE 21 MG/24HR
1 PATCH, TRANSDERMAL 24 HOURS TRANSDERMAL DAILY
Status: DISCONTINUED | OUTPATIENT
Start: 2019-07-30 | End: 2019-08-01 | Stop reason: HOSPADM

## 2019-07-30 RX ORDER — HYDROXYZINE PAMOATE 50 MG/1
50 CAPSULE ORAL 3 TIMES DAILY PRN
Status: ON HOLD | COMMUNITY
End: 2019-07-31 | Stop reason: SDUPTHER

## 2019-07-30 RX ORDER — CLONIDINE HYDROCHLORIDE 0.2 MG/1
0.2 TABLET ORAL 2 TIMES DAILY
COMMUNITY

## 2019-07-30 RX ORDER — DIVALPROEX SODIUM 500 MG/1
500 TABLET, EXTENDED RELEASE ORAL NIGHTLY
Status: DISCONTINUED | OUTPATIENT
Start: 2019-07-30 | End: 2019-08-01 | Stop reason: HOSPADM

## 2019-07-30 RX ADMIN — NICOTINE POLACRILEX 2 MG: 2 GUM, CHEWING BUCCAL at 09:39

## 2019-07-30 RX ADMIN — ESCITALOPRAM OXALATE 10 MG: 10 TABLET ORAL at 11:48

## 2019-07-30 RX ADMIN — TRAZODONE HYDROCHLORIDE 50 MG: 50 TABLET ORAL at 21:17

## 2019-07-30 RX ADMIN — NICOTINE POLACRILEX 2 MG: 2 GUM, CHEWING BUCCAL at 21:24

## 2019-07-30 RX ADMIN — CLONIDINE HYDROCHLORIDE 0.2 MG: 0.1 TABLET ORAL at 21:17

## 2019-07-30 RX ADMIN — DIVALPROEX SODIUM 500 MG: 500 TABLET, FILM COATED, EXTENDED RELEASE ORAL at 21:17

## 2019-07-30 RX ADMIN — HYDROXYZINE HYDROCHLORIDE 50 MG: 50 TABLET, FILM COATED ORAL at 11:47

## 2019-07-30 RX ADMIN — CLONIDINE HYDROCHLORIDE 0.2 MG: 0.1 TABLET ORAL at 11:47

## 2019-07-30 RX ADMIN — HYDROXYZINE HYDROCHLORIDE 50 MG: 50 TABLET, FILM COATED ORAL at 21:17

## 2019-07-30 ASSESSMENT — LIFESTYLE VARIABLES
HISTORY_ALCOHOL_USE: NO
HISTORY_ALCOHOL_USE: NO

## 2019-07-30 ASSESSMENT — SLEEP AND FATIGUE QUESTIONNAIRES
AVERAGE NUMBER OF SLEEP HOURS: 1
DIFFICULTY STAYING ASLEEP: YES
SLEEP PATTERN: DIFFICULTY FALLING ASLEEP
DIFFICULTY FALLING ASLEEP: YES
DO YOU USE A SLEEP AID: YES
DIFFICULTY FALLING ASLEEP: YES
AVERAGE NUMBER OF SLEEP HOURS: 5
DIFFICULTY ARISING: NO
DIFFICULTY ARISING: NO
RESTFUL SLEEP: NO
DO YOU HAVE DIFFICULTY SLEEPING: YES
RESTFUL SLEEP: NO
DO YOU HAVE DIFFICULTY SLEEPING: YES
SLEEP PATTERN: DIFFICULTY FALLING ASLEEP;DIFFICULTY ARISING;DISTURBED/INTERRUPTED SLEEP
DO YOU USE A SLEEP AID: YES
DIFFICULTY STAYING ASLEEP: NO

## 2019-07-30 ASSESSMENT — PAIN - FUNCTIONAL ASSESSMENT: PAIN_FUNCTIONAL_ASSESSMENT: 0-10

## 2019-07-30 ASSESSMENT — PATIENT HEALTH QUESTIONNAIRE - PHQ9: SUM OF ALL RESPONSES TO PHQ QUESTIONS 1-9: 14
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
## 2019-07-30 NOTE — H&P
or deformities. EXTREMITIES:  Symmetrical, no pretibial edema. Stacis sign negative. No discoloration or ulcerations. NEUROLOGIC:  The patient is conscious, alert, oriented,Cranial nerve II-XII intact, taste and smell were not examined. No apparent focal sensory or motor deficits. Muscle strength equal Michael. No facial droop, tongue protrudes centrally, no slurring of the speech. PROVISIONAL DIAGNOSES:      Principal Problem:    Severe major depression ([facility]  Resolved Problems:    * No resolved hospital problems.  *      SHANE ORNELAS - CNP on 7/30/2019 at 6:26 PM

## 2019-07-31 PROCEDURE — 6370000000 HC RX 637 (ALT 250 FOR IP): Performed by: NURSE PRACTITIONER

## 2019-07-31 PROCEDURE — 6370000000 HC RX 637 (ALT 250 FOR IP): Performed by: PSYCHIATRY & NEUROLOGY

## 2019-07-31 PROCEDURE — 1240000000 HC EMOTIONAL WELLNESS R&B

## 2019-07-31 PROCEDURE — 99232 SBSQ HOSP IP/OBS MODERATE 35: CPT | Performed by: NURSE PRACTITIONER

## 2019-07-31 RX ORDER — DIVALPROEX SODIUM 500 MG/1
500 TABLET, EXTENDED RELEASE ORAL NIGHTLY
Qty: 30 TABLET | Refills: 0 | Status: SHIPPED | OUTPATIENT
Start: 2019-07-31

## 2019-07-31 RX ORDER — TRAZODONE HYDROCHLORIDE 50 MG/1
50 TABLET ORAL NIGHTLY PRN
Qty: 30 TABLET | Refills: 0 | Status: SHIPPED | OUTPATIENT
Start: 2019-07-31

## 2019-07-31 RX ORDER — HYDROXYZINE PAMOATE 50 MG/1
50 CAPSULE ORAL 3 TIMES DAILY PRN
Qty: 30 CAPSULE | Refills: 0 | Status: SHIPPED | OUTPATIENT
Start: 2019-07-31

## 2019-07-31 RX ORDER — ESCITALOPRAM OXALATE 10 MG/1
10 TABLET ORAL DAILY
Qty: 30 TABLET | Refills: 0 | Status: SHIPPED | OUTPATIENT
Start: 2019-08-01

## 2019-07-31 RX ADMIN — ESCITALOPRAM OXALATE 10 MG: 10 TABLET ORAL at 08:54

## 2019-07-31 RX ADMIN — DIVALPROEX SODIUM 500 MG: 500 TABLET, FILM COATED, EXTENDED RELEASE ORAL at 21:08

## 2019-07-31 RX ADMIN — CLONIDINE HYDROCHLORIDE 0.2 MG: 0.1 TABLET ORAL at 21:08

## 2019-07-31 RX ADMIN — TRAZODONE HYDROCHLORIDE 50 MG: 50 TABLET ORAL at 21:08

## 2019-07-31 RX ADMIN — HYDROXYZINE HYDROCHLORIDE 50 MG: 50 TABLET, FILM COATED ORAL at 21:08

## 2019-07-31 RX ADMIN — CLONIDINE HYDROCHLORIDE 0.2 MG: 0.1 TABLET ORAL at 08:55

## 2019-07-31 NOTE — GROUP NOTE
Group Therapy Note    Date: July 30    Group Start Time: 2030  Group End Time: 2045  Group Topic: Wrap-Up    KAIDEN Azul        Group Therapy Note    Attendees: 11:20         Patient's Goal:  Wrap Up     Notes:  Wrap Up    Status After Intervention:  Improved    Participation Level:  Active Listener    Participation Quality: Appropriate      Speech:  normal      Thought Process/Content: Logical      Affective Functioning: Congruent      Mood: WNL      Level of consciousness:  Alert, Oriented x4 and Attentive      Response to Learning: Able to verbalize current knowledge/experience, Able to verbalize/acknowledge new learning, Able to retain information, Capable of insight, Able to change behavior and Progressing to goal      Endings: None Reported    Modes of Intervention: Education, Support, Socialization, Exploration, Limit-setting and Reality-testing      Discipline Responsible: [facility] [address] [facility] [place]      Signature:  David Ocampo

## 2019-07-31 NOTE — PROGRESS NOTES
RN has reviewed all MHP and LPN documentation.
place, time/date and situation   Cognition:  grossly intact   Memory: intact   Insight:  fair   Judgment: fair   Suicidal Ideations: denies suicidal ideation   Homicidal Ideations: Negative for homicidal ideation      Medication Side Effects: absent       Attention Span attention span and concentration were age appropriate       Labs  No results found for this or any previous visit (from the past 67 hour(s)). Medications  Current Facility-Administered Medications   Medication Dose Route Frequency Provider Last Rate Last Dose    traZODone (DESYREL) tablet 50 mg  50 mg Oral Nightly PRN Bharath DELANEY MD   50 mg at 07/30/19 2117    nicotine polacrilex (NICORETTE) gum 2 mg  2 mg Oral PRN Natasha Everett MD   2 mg at 07/30/19 2124    cloNIDine (CATAPRES) tablet 0.2 mg  0.2 mg Oral BID Scarlet Blowers, APRN - CNP   0.2 mg at 07/31/19 0855    hydrOXYzine (ATARAX) tablet 50 mg  50 mg Oral TID PRN Scarlet Blowers, APRN - CNP   50 mg at 07/30/19 2117    nicotine (NICODERM CQ) 21 MG/24HR 1 patch  1 patch Transdermal Daily Scarlet Blowers, APRN - CNP   1 patch at 07/31/19 0854    escitalopram (LEXAPRO) tablet 10 mg  10 mg Oral Daily Scarlet Blowers, APRN - CNP   10 mg at 07/31/19 0854    divalproex (DEPAKOTE ER) extended release tablet 500 mg  500 mg Oral Nightly Scarlet Blowers, APRN - CNP   500 mg at 07/30/19 2117         cloNIDine  0.2 mg Oral BID    nicotine  1 patch Transdermal Daily    escitalopram  10 mg Oral Daily    divalproex  500 mg Oral Nightly       ASSESSMENT  Severe major depression ([facility] [address]     Patient's Response to Treatment: positive    PLAN  · Continue inpatient psychiatric treatment  · Supportive therapy with medication management. Reviewed risks and benefits as well as potential side effects with patient.   · Therapeutic activities and groups  · Follow up at [facility] [place] after symptoms stabilized  · Patient will discharge tomorrow to [facility] for additional

## 2019-08-01 VITALS
BODY MASS INDEX: 25.11 KG/M2 | SYSTOLIC BLOOD PRESSURE: 121 MMHG | HEART RATE: 120 BPM | DIASTOLIC BLOOD PRESSURE: 90 MMHG | RESPIRATION RATE: 14 BRPM | TEMPERATURE: 98.4 F | WEIGHT: 160 LBS | HEIGHT: 67 IN

## 2019-08-01 PROCEDURE — 6370000000 HC RX 637 (ALT 250 FOR IP): Performed by: NURSE PRACTITIONER

## 2019-08-01 PROCEDURE — 5130000000 HC BRIDGE APPOINTMENT

## 2019-08-01 PROCEDURE — 99239 HOSP IP/OBS DSCHRG MGMT >30: CPT | Performed by: NURSE PRACTITIONER

## 2019-08-01 RX ADMIN — ESCITALOPRAM OXALATE 10 MG: 10 TABLET ORAL at 08:12

## 2019-08-01 RX ADMIN — CLONIDINE HYDROCHLORIDE 0.2 MG: 0.1 TABLET ORAL at 08:12

## 2019-08-01 ASSESSMENT — PAIN - FUNCTIONAL ASSESSMENT: PAIN_FUNCTIONAL_ASSESSMENT: 0-10

## 2019-08-01 NOTE — PLAN OF CARE
Problem: Altered Mood, Depressive Behavior:  Goal: Ability to disclose and discuss suicidal ideas will improve  Description  Ability to disclose and discuss suicidal ideas will improve  8/1/2019 0900 by Asuncion Jones LPN  Outcome: Ongoing  Note:   Pt denies thoughts of self harm and is agreeable to seeking out should thoughts of self harm arise. Safe environment maintained. Q15 minute checks for safety cont per unit policy. Will cont to monitor for safety and provides support and reassurance as needed.
Problem: Altered Mood, Depressive Behavior:  Goal: Able to verbalize and/or display a decrease in depressive symptoms  Description  Able to verbalize and/or display a decrease in depressive symptoms  8/1/2019 0037 by Praveen Liagn RN  Outcome: Ongoing  Note:   Pt denies issues with depression. She is anxious regarding discharge on tomorrow, but states she is ready. No physical concerns. Sleep and appetite adequate. Pt reports she feels better. Safety maintained per unit policy, including q 11E patient safety checks. Problem: Altered Mood, Depressive Behavior:  Goal: Ability to disclose and discuss suicidal ideas will improve  Description  Ability to disclose and discuss suicidal ideas will improve  8/1/2019 0037 by Praveen Liang RN  Outcome: Ongoing  Note:   Pt denies suicidal ideations at this time and agrees to seek assistance from staff should thoughts of self harm arise. Will continue to monitor patient for safety and behavior.
[place] to Time, [place] to Situation, [place] to Place  Attention:Normal: Yes  Thought Processes: Circumstantial  Thought Content:Normal: Yes  Thought Content: Preoccupations, Poverty of Content  Hallucinations: None  Delusions: No  Memory:Normal: Yes  Insight and Judgment: Yes  Insight and Judgment: Unmotivated, Poor Insight  Present Suicidal Ideation: No  Present Homicidal Ideation: No    Daily Assessment Last Entry:   Daily Sleep (WDL): Within Defined Limits         Patient Currently in Pain: Denies  Daily Nutrition (WDL): Within Defined Limits    Patient Monitoring:  Frequency of Checks: 4 times per hour, close    Psychiatric Symptoms:   Depression Symptoms  Depression Symptoms: No problems reported or observed. Anxiety Symptoms  Anxiety Symptoms: No problems reported or observed. Mercedes Symptoms  Mercedes Symptoms: No problems reported or observed. Psychosis Symptoms  Delusion Type: No problems reported or observed.     Suicide Risk CSSR-S:  1) Within the past month, have you wished you were dead or wished you could go to sleep and not wake up? : Yes  2) Have you actually had any thoughts of killing yourself? : No  6) Have you ever done anything, started to do anything, or prepared to do anything to end your life?: No  Change in Result:  no Change in Plan of care:  no      EDUCATION:   Learner Progress Toward Treatment Goals:   Reviewed results and recommendations of this team, Reviewed group plan and strategies, Reviewed signs, symptoms and risk of self harm and violent behavior, Reviewed goals and plan of care    Method:  small group, individual verbal education    Outcome:   Verbalized by patient but needs reinforcement to obtain goals    PATIENT GOALS:  Short term:  Find a rehab, discharge planning   Long term:  Stay clean/sober, positive thinking     PLAN/TREATMENT RECOMMENDATIONS UPDATE:  continue with group therapies, increased socialization, continue planning for after discharge goals, continue with

## 2019-08-01 NOTE — GROUP NOTE
Group Therapy Note    Date: August 1    Group Start Time: 0900  Group End Time: 0930  Group Topic: Community Meeting    QUINN Avelar    Pt did not attend 900 community meeting despite staff invitatiion to attend.  Talk time was offered as an alternative to attending group     Signature:  Holden Garcia

## 2019-08-01 NOTE — BH NOTE
[facility]  Discharge Note     Pt belongings: Retrieved from room/safe, reviewed and packed to take with. Dentures: None  Vision - Corrective Lenses: Glasses  Hearing Aid: None  Jewelry: None  Body Piercings Removed: N/A  Clothing: Footwear, Pants, Shirt, Pajamas, Undergarments (Comment)  Were All Patient Medications Collected?: Yes  Other Valuables: Cell phone, Other (Comment)(makeup, book, papers, toiletries, knife)       Discharged to [address] [place] [address]. Instructed on discharge instructions, pt verbalizes understanding and signs AVS. Pt in control at time of discharge. Pt ambulates to [facility] main entrance with psych staff. Rx filled and sent with patient. No complaints voiced at this time.         Status EXAM upon discharge:  Status and Exam  Normal: No  Facial Expression: Brightened  Affect: Appropriate  Level of Consciousness: Alert  Mood:Normal: Yes  Mood: Anxious  Motor Activity:Normal: Yes  Interview Behavior: Cooperative  Preception: Glen to Person, Lylia Klippel to Place, [place] to Time, [place] to Situation  Attention:Normal: Yes  Thought Processes: Circumstantial  Thought Content:Normal: Yes  Thought Content: Preoccupations  Hallucinations: None  Delusions: No  Memory:Normal: Yes  Insight and Judgment: Yes  Insight and Judgment: Poor Judgment  Present Suicidal Ideation: No  Present Homicidal Ideation: No    Candido Guillen LPN
Patient given tobacco quitline number 45507166125 at this time, refusing to call at this time, states \" I just dont want to quit now\"- patient given information as to the dangers of long term tobacco use. Continue to reinforce the importance of tobacco cessation.
Patient given tobacco quitline number 65409716071 at this time, refusing to call at this time, states \" I just dont want to quit now\"- patient given information as to the dangers of long term tobacco use. Continue to reinforce the importance of tobacco cessation.
`[facility] [place]  Admission Note     Admission Type:   Admission Type:  Involuntary    Reason for admission:  Reason for Admission: broke up with boyfirend, off medication, polysubstance abuse, Suicidal with no current plan    PATIENT STRENGTHS:  Strengths: Communication, Connection to output provider, Medication Compliance    Patient Strengths and Limitations:  Limitations: Perceives need for assistance with self-care    Addictive Behavior:   Addictive Behavior  In the past 3 months, have you felt or has someone told you that you have a problem with:  : None  Do you have a history of Chemical Use?: Yes  Do you have a history of Alcohol Use?: No  Do you have a history of Street Drug Abuse?: No  Histroy of Prescripton Drug Abuse?: No    Medical Problems:   Past Medical History:   Diagnosis Date    Chronic hepatitis C without hepatic coma ([facility] [address] 6/30/2018    Drug abuse ([facility] [address] 6/30/2018    Drug abuse, cocaine type ([facility] [address]     Drug abuse, IV ([facility] [address]     Empyema lung ([facility] [address] 6/29/2018    Impetigo 6/30/2018    MRSA (methicillin resistant staph aureus) culture positive     MSSA (methicillin susceptible Staphylococcus aureus) infection     Pleural effusion on right 6/30/2018       Status EXAM:  Status and Exam  Normal: No  Facial Expression: Sad  Affect: Blunt  Level of Consciousness: Alert  Mood:Normal: No  Mood: Depressed  Motor Activity:Normal: Yes  Interview Behavior: Cooperative  Attention:Normal: Yes  Thought Content:Normal: No  Delusions: No  Memory:Normal: Yes  Insight and Judgment: No  Insight and Judgment: Poor Judgment, Poor Insight, Unrealistic  Present Suicidal Ideation: Yes  Present Homicidal Ideation: No    Tobacco Screening:  Practical Counseling, on admission, aimee X, if applicable and completed (first 3 are required if patient doesn't refuse):            ( )  Recognizing danger situations (included triggers and roadblocks)                    ( )  Coping skills (new ways to manage stress,
Worry: Not on file     Inability: Not on file    Transportation needs:     Medical: Not on file     Non-medical: Not on file   Tobacco Use    Smoking status: Former Smoker     Packs/day: 1.00     Years: 4.00     Pack years: 4.00     Types: Cigarettes     Start date: 2014     Last attempt to quit: 2018     Years since quittin.1    Smokeless tobacco: Never Used   Substance and Sexual Activity    Alcohol use: No    Drug use: Yes     Frequency: 3.0 times per week     Types: IV, Cocaine, Opiates , Marijuana    Sexual activity: Not on file   Lifestyle    Physical activity:     Days per week: Not on file     Minutes per session: Not on file    Stress: Not on file   Relationships    Social connections:     Talks on phone: Not on file     Gets together: Not on file     Attends Bahai service: Not on file     Active member of club or organization: Not on file     Attends meetings of clubs or organizations: Not on file     Relationship status: Not on file    Intimate partner violence:     Fear of current or ex partner: Not on file     Emotionally abused: Not on file     Physically abused: Not on file     Forced sexual activity: Not on file   Other Topics Concern    Not on file   Social History Narrative    Not on file         Mental Status  Pt. was alert, fully oriented, and cooperative. Appearance and hygiene were  fair. Patient is disheveled but hygiene is appropriate. Dressed in street clothing. Average height and build. Appears older than stated age. . Mood was depressed. Affect was inappropriate with mood. Constricted and tearful at times. Thought process was tangential. Patient denies hallucinations. No paranoia or delusions noted. Patient denied suicidal ideations. Patient denied homicidal ideations . Patient's gross cognitive functions were intact. Insight and judgement were poor. Both recent and remote memory were intact. She is alert and oriented x 4.       Psychomotor